# Patient Record
Sex: MALE | URBAN - METROPOLITAN AREA
[De-identification: names, ages, dates, MRNs, and addresses within clinical notes are randomized per-mention and may not be internally consistent; named-entity substitution may affect disease eponyms.]

---

## 2023-07-21 ENCOUNTER — OFFICE VISIT (OUTPATIENT)
Dept: URBAN - METROPOLITAN AREA CLINIC 23 | Facility: CLINIC | Age: 63
End: 2023-07-21
Payer: COMMERCIAL

## 2023-07-21 DIAGNOSIS — B02.31 ZOSTER CONJUNCTIVITIS: Primary | ICD-10-CM

## 2023-07-21 PROCEDURE — 99204 OFFICE O/P NEW MOD 45 MIN: CPT | Performed by: OPTOMETRIST

## 2023-07-21 RX ORDER — PREDNISOLONE ACETATE 10 MG/ML
1 % SUSPENSION/ DROPS OPHTHALMIC
Qty: 5 | Refills: 0 | Status: ACTIVE
Start: 2023-07-21

## 2023-07-21 ASSESSMENT — KERATOMETRY
OD: 42.63
OS: 42.75

## 2023-07-21 ASSESSMENT — INTRAOCULAR PRESSURE
OD: 18
OS: 17

## 2023-07-21 NOTE — IMPRESSION/PLAN
Impression: Zoster conjunctivitis: B02.31. Plan: Pt edu on all findings. No active lesions noted today. Advised patient to start Pred Acetate TID OD as prescribed. Patient states he may not take Pred Acetate due to him feeling better. Continue OTC AT's PRN OU for comfort. Instructed patient to call if symptoms worsen or persist. RTC PRN.